# Patient Record
Sex: FEMALE | Race: WHITE | Employment: UNEMPLOYED | URBAN - METROPOLITAN AREA
[De-identification: names, ages, dates, MRNs, and addresses within clinical notes are randomized per-mention and may not be internally consistent; named-entity substitution may affect disease eponyms.]

---

## 2020-01-14 ENCOUNTER — OFFICE VISIT (OUTPATIENT)
Dept: SURGERY | Age: 30
End: 2020-01-14

## 2020-01-14 VITALS
HEIGHT: 61 IN | WEIGHT: 105 LBS | DIASTOLIC BLOOD PRESSURE: 81 MMHG | BODY MASS INDEX: 19.83 KG/M2 | SYSTOLIC BLOOD PRESSURE: 120 MMHG | HEART RATE: 74 BPM

## 2020-01-14 DIAGNOSIS — N60.11 FIBROCYSTIC BREAST CHANGES OF BOTH BREASTS: Primary | ICD-10-CM

## 2020-01-14 DIAGNOSIS — N60.12 FIBROCYSTIC BREAST CHANGES OF BOTH BREASTS: Primary | ICD-10-CM

## 2020-01-14 DIAGNOSIS — N63.10 BREAST MASS, RIGHT: ICD-10-CM

## 2020-01-14 NOTE — LETTER
January 14, 2020 [address] [address] Dear Kenna Chung: Thank you for requesting access to [facility]. Please follow the instructions below to view your test results, access and download parts of your medical record, view details of your past and upcoming appointments, and view your medications online. How Do I Sign Up? 1. In your internet browser, go to [email or web address]. aspx 2. Click on the First Time User? Click Here link in the Sign In box. You will see the New Member Sign Up page. 3. Enter your [facility] Access Code exactly as it appears below. You will not need to use this code after youve completed the sign-up process. If you do not sign up before the expiration date, you must request a new code. · [facility] Access Code: [email or web address] 
· Expires: 2/28/2020  9:58 AM 
 
4. Enter the last four digits of your Social Security Number (xxxx) and Date of Birth (mm/dd/yyyy) as indicated and click Submit. You will be taken to the next sign-up page. 5. Create a [facility] ID. This will be your [facility] login ID and cannot be changed, so think of one that is secure and easy to remember. 6. Create a [facility] password. You can change your password at any time. 7. Enter your Password Reset Question and Answer. This can be used at a later time if you forget your password. 8. Enter your e-mail address. You will receive e-mail notification when new information is available in [address]. 9. Click Sign Up. You can now view and download portions of your medical record. 10. Click the Download Summary menu link to download a portable copy of your medical information. Additional Information: If you require any assistance or have any questions, please contact our [address] at [phone], email at [email or web address]. Remember, [facility] is NOT to be used for urgent needs.  For medical emergencies, dial 911. Now available from your iPhone and Android! Sincerely, Graham Koyanagi

## 2020-01-14 NOTE — LETTER
1/14/2020 1:50 PM 
 
Patient:  Yaa Martin YOB: 1990 Date of Visit: 1/14/2020 Dear Kimber Israel MD 
[address] [address] [place] [id number] VIA Facsimile: [phone] 
 : Thank you for referring Ms. Yaa Martin to me for evaluation/treatment. Below are the relevant portions of my assessment and plan of care. ASSESSMENT and PLAN Encounter Diagnoses Name Primary?  Fibrocystic breast changes of both breasts Yes  Breast mass, right Right nipple mass - discussed findings with patient of likely diagnosis of either sebaceous cyst or clogged milk duct with some congealed milk trapped and creating mass. Discussed observation with use of warm compresses vs aspiration vs I&D. Would not recommend I&D as it could lead to issues with the baby latching and with pumping. As the baby is not able to latch on that side currently and there is concern about weight gain, she requested attempted aspiration. 0.5ml of sebum and/or congealed milk aspirated without issue and mass decompressed. Reviewed possibility of recurrence and risk and s/sx of infection. She will monitor the area and follow-up PRN. Continue to nurse and pump as desired. She is comfortable with this plan. All questions answered and she stated understanding. If you have questions, please do not hesitate to call me. I look forward to following Ms. Adal Mosley along with you. Sincerely, Evgeny Cueto NP

## 2020-01-14 NOTE — PROGRESS NOTES
HISTORY OF PRESENT ILLNESS  Elena Fall is a 34 y.o. female. HPI   NEW patient presents for consultation at the request of Dr. Gurpreet Montalvo for mass on RIGHT nipple that she has noticed for 2-3 weeks. Says that this has not changed since she first noticed it and is not painful. She is breastfeeding (pretty much pumping at this point), and says that her milk supply is less from that breast.  Her infant son has trouble latching on this side. He has had some medical issues and had to be hospitalized after his birth for RSV. FH - maternal great aunt was diagnosed with breast cancer at an unknown age. She has not had any breast imaging. OB History    No obstetric history on file. Obstetric Comments   Menarche 15, LMP 2/2019, # of children 11, age of 4st delivery 21, Hysterectomy/oophorectomy No/No, Breast bx No, history of breast feeding Yes, currently, BCP No, Hormone therapy No             Review of Systems   Constitutional: Negative. HENT: Negative. Eyes: Negative. Respiratory: Negative. Cardiovascular: Negative. Gastrointestinal: Negative. Genitourinary: Negative. Musculoskeletal: Negative. Skin: Negative. Neurological: Positive for headaches. Endo/Heme/Allergies: Negative. Psychiatric/Behavioral: Negative. Physical Exam  Constitutional:       Appearance: Normal appearance. Chest:       Neurological:      Mental Status: She is alert. Psychiatric:         Attention and Perception: Attention normal.         Mood and Affect: Mood normal.         Speech: Speech normal.         Behavior: Behavior normal.     ASPIRATION OF BREAST CYST  Indication : CYST:  right  nipple  Ultrasound Findings: none  Prep : Alcohol. Anesthesia : Lidocaine with epinephrine, 1ml  Guidance : palpation   Yield :  0.5ml milky thick fluid was aspirated with an 18 gauge needle. Effect : Cyst completely aspirated.   Tolerance: Pt tolerated the procedure with minimal discomfort  Diposition:  Follow up if new mass occurs    Visit Vitals  /81   Pulse 74   Ht 5' 1\" (1.549 m)   Wt 105 lb (47.6 kg)   LMP  (Approximate) Comment: One year ago - pre-pregnancy   BMI 19.84 kg/m²     ASSESSMENT and PLAN  Encounter Diagnoses   Name Primary?  Fibrocystic breast changes of both breasts Yes    Breast mass, right      Right nipple mass - discussed findings with patient of likely diagnosis of either sebaceous cyst or clogged milk duct with some congealed milk trapped and creating mass. Discussed observation with use of warm compresses vs aspiration vs I&D. Would not recommend I&D as it could lead to issues with the baby latching and with pumping. As the baby is not able to latch on that side currently and there is concern about weight gain, she requested attempted aspiration. 0.5ml of sebum and/or congealed milk aspirated without issue and mass decompressed. Reviewed possibility of recurrence and risk and s/sx of infection. She will monitor the area and follow-up PRN. Continue to nurse and pump as desired. She is comfortable with this plan. All questions answered and she stated understanding.

## 2021-01-25 ENCOUNTER — OFFICE VISIT (OUTPATIENT)
Dept: SURGERY | Age: 31
End: 2021-01-25
Payer: COMMERCIAL

## 2021-01-25 VITALS
DIASTOLIC BLOOD PRESSURE: 64 MMHG | HEIGHT: 61 IN | SYSTOLIC BLOOD PRESSURE: 107 MMHG | HEART RATE: 77 BPM | WEIGHT: 105 LBS | BODY MASS INDEX: 19.83 KG/M2 | TEMPERATURE: 97.6 F

## 2021-01-25 DIAGNOSIS — N63.20 LEFT BREAST MASS: Primary | ICD-10-CM

## 2021-01-25 PROCEDURE — 99214 OFFICE O/P EST MOD 30 MIN: CPT | Performed by: SURGERY

## 2021-01-25 NOTE — PROGRESS NOTES
HISTORY OF PRESENT ILLNESS  Nettie Sousa is a 27 y.o. female. HPI ESTABLISHED patient here for LEFT breast mass x 2 weeks. No other symptoms to report. Saw Serena Marrero 1/14/2020 for RIGHT breast lump. She was nursing at the time. 0.5 ml sebum and/or congealed milk aspirated. This issue has resolved. Patient discontinued nursing in March 2020.     FH - maternal great aunt was diagnosed with breast cancer at an unknown age.       She has not had any breast imaging. Review of Systems   All other systems reviewed and are negative. Physical Exam  Vitals signs and nursing note reviewed. Chest:      Breasts: Breasts are symmetrical.         Right: No inverted nipple, mass, nipple discharge, skin change or tenderness. Left: Mass (small round 1 cm mass 1:00 ) present. No inverted nipple, nipple discharge, skin change or tenderness. Lymphadenopathy:      Cervical: No cervical adenopathy. Upper Body:      Right upper body: No supraclavicular, axillary or pectoral adenopathy. Left upper body: No supraclavicular, axillary or pectoral adenopathy. ASSESSMENT and PLAN    ICD-10-CM ICD-9-CM    1. Left breast mass  N63.20 611.72 VAL 3D DAISY W MAMMO BI DX INCL CAD     Left breast mass 1:00. Will schedule diagnostic mammo and us. Plan will depend on imaging. 30 minutes was spent with patient on counseling and coordination of care.

## 2021-01-25 NOTE — LETTER
1/27/2021 Patient: Marce Sousa YOB: 1990 Date of Visit: 1/25/2021 Kymberly Lopez MD 
[address] [place] [id number] Via Fax: [phone] Dear Kymberly Lopez MD, Thank you for referring Ms. Marce Sousa to [address] [place] [address] for evaluation. My notes for this consultation are attached. If you have questions, please do not hesitate to call me. I look forward to following your patient along with you. Sincerely, Sharyn Fowler MD

## 2021-01-25 NOTE — PATIENT INSTRUCTIONS

## 2021-02-03 ENCOUNTER — HOSPITAL ENCOUNTER (OUTPATIENT)
Dept: MAMMOGRAPHY | Age: 31
Discharge: HOME | End: 2021-02-03
Attending: SURGERY
Payer: COMMERCIAL

## 2021-02-03 DIAGNOSIS — N63.20 LEFT BREAST MASS: ICD-10-CM

## 2021-02-03 PROCEDURE — 76642 ULTRASOUND BREAST LIMITED: CPT

## 2021-02-03 PROCEDURE — 77062 BREAST TOMOSYNTHESIS BI: CPT

## 2024-08-29 ENCOUNTER — OFFICE VISIT (OUTPATIENT)
Dept: OBSTETRICS AND GYNECOLOGY | Facility: CLINIC | Age: 34
End: 2024-08-29
Payer: MEDICAID

## 2024-08-29 VITALS
DIASTOLIC BLOOD PRESSURE: 82 MMHG | HEIGHT: 61 IN | BODY MASS INDEX: 20.77 KG/M2 | WEIGHT: 110 LBS | SYSTOLIC BLOOD PRESSURE: 118 MMHG

## 2024-08-29 DIAGNOSIS — O21.9 NAUSEA/VOMITING IN PREGNANCY: ICD-10-CM

## 2024-08-29 DIAGNOSIS — Z64.1 GRAND MULTIPARA: ICD-10-CM

## 2024-08-29 DIAGNOSIS — N92.6 MISSED MENSES: Primary | ICD-10-CM

## 2024-08-29 LAB
B-HCG UR QL: POSITIVE
BILIRUB BLD-MCNC: NEGATIVE MG/DL
CLARITY, POC: CLEAR
COLOR UR: YELLOW
EXPIRATION DATE: ABNORMAL
GLUCOSE UR STRIP-MCNC: NEGATIVE MG/DL
INTERNAL NEGATIVE CONTROL: ABNORMAL
INTERNAL POSITIVE CONTROL: ABNORMAL
KETONES UR QL: NEGATIVE
LEUKOCYTE EST, POC: NEGATIVE
Lab: ABNORMAL
NITRITE UR-MCNC: NEGATIVE MG/ML
PH UR: 5 [PH] (ref 5–8)
PROT UR STRIP-MCNC: ABNORMAL MG/DL
RBC # UR STRIP: NEGATIVE /UL
SP GR UR: 1.01 (ref 1–1.03)
UROBILINOGEN UR QL: NORMAL

## 2024-08-29 RX ORDER — ONDANSETRON 4 MG/1
4 TABLET, ORALLY DISINTEGRATING ORAL EVERY 8 HOURS PRN
Qty: 30 TABLET | Refills: 2 | Status: SHIPPED | OUTPATIENT
Start: 2024-08-29

## 2024-08-29 NOTE — PROGRESS NOTES
Confirmation of pregnancy     Chief Complaint   Patient presents with    Amenorrhea     Confirmation of pregnancy         Krupa Jacob is being seen today for evaluation of absence of menses. Due to her period being late, she tested for pregnancy and had + home UPT. She is a 33 y.o. . This problem is new to me, the examiner.       OB History          8    Para   6    Term   6            AB   1    Living   6         SAB        IAB        Ectopic   1    Molar        Multiple        Live Births   6          Obstetric Comments    x 6- proven pelvis 8#4oz  Ectopic x 1               HPI     LNMP: 2024  Confident with date: Yes  Taking prenatal vitamins: Yes. Needs RX: No  Planned pregnancy: No  Prior obstetric issues, potential pregnancy concerns:  x 6- in multiple states (delivery from 2019 in records- others requested)  Family history of genetic issues (includes FOB): no  Varicella Hx: virus  Flu vaccine: no  COVID 19 vaccine: yes. Booster vaccine: no  History of STDs: no  Current medications: PNV  Last pap smear:   Smoker: No  Drug or alcohol abuse: No  H/O physical, emotional or sexual abuse: no  H/O mental health disorder: no  Prior testing for Cystic Fibrosis Carrier or Sickle Cell Trait- no  Prepregnancy BMI - Body mass index is 20.78 kg/m².    Past Medical History:   Diagnosis Date    Ectopic pregnancy     Migraine     PMS (premenstrual syndrome)     Urinary tract infection        Past Surgical History:   Procedure Laterality Date    BREAST AUGMENTATION      WISDOM TOOTH EXTRACTION           Current Outpatient Medications:     Prenatal Vit-Fe Fumarate-FA (PRENATAL PO), Take  by mouth., Disp: , Rfl:     ondansetron ODT (ZOFRAN-ODT) 4 MG disintegrating tablet, Place 1 tablet on the tongue Every 8 (Eight) Hours As Needed for Nausea., Disp: 30 tablet, Rfl: 2    Allergies   Allergen Reactions    Sulfamethoxazole-Trimethoprim Hives       Social History     Socioeconomic  "History    Marital status:    Tobacco Use    Smoking status: Never    Smokeless tobacco: Never   Vaping Use    Vaping status: Never Used   Substance and Sexual Activity    Alcohol use: Never    Drug use: Never    Sexual activity: Yes     Partners: Male     Birth control/protection: Condom       History reviewed. No pertinent family history.    Review of systems     Review of Systems   Gastrointestinal:  Positive for nausea. Negative for vomiting.   Genitourinary:  Positive for menstrual problem. Negative for vaginal bleeding.       Objective    /82   Ht 154.9 cm (61\")   Wt 49.9 kg (110 lb)   LMP 07/10/2024 (Approximate)   BMI 20.78 kg/m²     Physical Exam  Vitals reviewed.   Constitutional:       General: She is awake. She is not in acute distress.     Appearance: She is not ill-appearing.   Eyes:      Conjunctiva/sclera: Conjunctivae normal.   Pulmonary:      Effort: Pulmonary effort is normal. No respiratory distress.   Musculoskeletal:      Cervical back: Neck supple. No rigidity.   Skin:     General: Skin is warm and dry.      Capillary Refill: Capillary refill takes less than 2 seconds.   Neurological:      Mental Status: She is alert and oriented to person, place, and time.   Psychiatric:         Mood and Affect: Mood and affect normal.         Behavior: Behavior normal.           Assessment/Plan      Missed menses: + UPT in office. LNMP 7/13/2024 =  6w5d EGA with an EDC=4/19/2025. US confirms IUP with +FCA: Yes. IMP: GS w YS and viable fetal pole seen with UT. 6w1d. FHR 124bpm. UT anteverted. OV WNL.  Oriented to practice.     Pregnancy: Disc importance of regular prenatal care. Enc PNV daily. Counseled on providers and on call phone. Disc Tylenol products are ok and encouraged no ibuprofen or ASA in pregnancy.  Disc exercise in pregnancy, diet, expected weight gain, etc. Enc no use of tobacco, vaping, drugs, or alcohol during pregnancy. Rev warn s/s of SAB.     Labs: Pt counseled on genetic " screening, Quad screen, AFP, and NIPS.     BMI is within normal parameters. No other follow-up for BMI required.      Smoker- non-smoker    6.   COVID19 precautions were reviewed with the patient. Continue to encourage good hand hygiene.  Hand hygeine performed before and after seeing the patient. Also encouraged COVID booster vaccine at 6 month interval from last COVID vaccine. She is s/p Covid vaccine; no booster    7.  Flu vaccine. Encouraged the flu vaccine during pregnancy. Discuss normal physiological changes during pregnancy increase the susceptibility of the flu virus and increase the risk of severe illness for the pregnant woman. Disc flu can be harmful to the unborn baby as well. Enc the flu vaccine. Disc with patient that getting the flu vaccine is the first and most important step in protecting against the flu. Flu vaccines given during pregnancy help protect both the mother and the baby. Getting the flu vaccine during pregnancy also helps protect the  from flu illness for several months after their birth, when then are too young to get vaccinated. Also disc the importance of good hand hygiene and avoiding people who are sick. The patient has not received the flu vaccine.     8. Nausea- taking Unisom/B6 with slight improvement; ERX Zofran    9. The patient understands that she is a grand multipara because she has given birth to 5 or more infants which increases her risk during pregnancy for factors including but not limited to abnormal fetal presentation, precipitate delivery, uterine atony, placenta previa, uterine rupture, postpartum hemorrhage, etc.     10. Hx of SGA per records in EPIC- 5#14oz       All questions answered.     Counseling was given to patient for the following topics: diagnostic results, instructions for management, risk factor reductions, prognosis, patient and family education, impressions, risks and benefits of treatment options, and importance of treatment compliance .  Total time of the encounter was 30 minutes and 25 minutes was spent counseling.  This time does not include time spent performing ultrasound.    Encounter Diagnoses   Name Primary?    Missed menses Yes    Nausea/vomiting in pregnancy     Grand multipara        Diagnoses and all orders for this visit:    Missed menses  -     POC Urinalysis Dipstick  -     POC Pregnancy, Urine    Nausea/vomiting in pregnancy  -     ondansetron ODT (ZOFRAN-ODT) 4 MG disintegrating tablet; Place 1 tablet on the tongue Every 8 (Eight) Hours As Needed for Nausea.    Grand multipara    Other orders  -     Prenatal Vit-Fe Fumarate-FA (PRENATAL PO); Take  by mouth.        RTO in 2 weeks for new OB exam/Pap, labs and repeat ultrasound (FHR 124bpm)    Deedee Patiño, APRN  8/29/2024  11:23 EDT

## 2024-09-11 ENCOUNTER — INITIAL PRENATAL (OUTPATIENT)
Dept: OBSTETRICS AND GYNECOLOGY | Facility: CLINIC | Age: 34
End: 2024-09-11
Payer: MEDICAID

## 2024-09-11 VITALS — WEIGHT: 110 LBS | SYSTOLIC BLOOD PRESSURE: 118 MMHG | BODY MASS INDEX: 20.78 KG/M2 | DIASTOLIC BLOOD PRESSURE: 76 MMHG

## 2024-09-11 DIAGNOSIS — Z64.1 GRAND MULTIPARA: ICD-10-CM

## 2024-09-11 DIAGNOSIS — Z36.9 ENCOUNTER FOR ANTENATAL SCREENING, UNSPECIFIED: ICD-10-CM

## 2024-09-11 DIAGNOSIS — Z78.9 BREASTFEEDING (INFANT): ICD-10-CM

## 2024-09-11 DIAGNOSIS — Z34.91 INITIAL OBSTETRIC VISIT IN FIRST TRIMESTER: Primary | ICD-10-CM

## 2024-09-11 DIAGNOSIS — Z87.59 HISTORY OF PRIOR PREGNANCY WITH IUGR NEWBORN: ICD-10-CM

## 2024-09-11 DIAGNOSIS — Z01.419 CERVICAL SMEAR, AS PART OF ROUTINE GYNECOLOGICAL EXAMINATION: ICD-10-CM

## 2024-09-11 DIAGNOSIS — O20.8 SUBCHORIONIC HEMORRHAGE IN FIRST TRIMESTER: ICD-10-CM

## 2024-09-11 PROBLEM — O21.9 NAUSEA AND VOMITING DURING PREGNANCY PRIOR TO 22 WEEKS GESTATION: Status: ACTIVE | Noted: 2024-09-11

## 2024-09-11 LAB
BILIRUB BLD-MCNC: NEGATIVE MG/DL
CLARITY, POC: CLEAR
COLOR UR: YELLOW
GLUCOSE UR STRIP-MCNC: NEGATIVE MG/DL
KETONES UR QL: NEGATIVE
LEUKOCYTE EST, POC: ABNORMAL
NITRITE UR-MCNC: NEGATIVE MG/ML
PH UR: 5 [PH] (ref 5–8)
PROT UR STRIP-MCNC: ABNORMAL MG/DL
RBC # UR STRIP: NEGATIVE /UL
SP GR UR: 1.01 (ref 1–1.03)
UROBILINOGEN UR QL: NORMAL

## 2024-09-11 PROCEDURE — 99214 OFFICE O/P EST MOD 30 MIN: CPT | Performed by: NURSE PRACTITIONER

## 2024-09-11 RX ORDER — FAMOTIDINE 20 MG/1
20 TABLET, FILM COATED ORAL 2 TIMES DAILY
Qty: 60 TABLET | Refills: 1 | Status: SHIPPED | OUTPATIENT
Start: 2024-09-11 | End: 2025-09-11

## 2024-09-11 RX ORDER — PROMETHAZINE HYDROCHLORIDE 12.5 MG/1
12.5 TABLET ORAL EVERY 6 HOURS PRN
Qty: 30 TABLET | Refills: 1 | Status: SHIPPED | OUTPATIENT
Start: 2024-09-11

## 2024-09-12 LAB
ABO GROUP BLD: NORMAL
BASOPHILS # BLD AUTO: 0.1 X10E3/UL (ref 0–0.2)
BASOPHILS NFR BLD AUTO: 1 %
BLD GP AB SCN SERPL QL: NEGATIVE
EOSINOPHIL # BLD AUTO: 0.1 X10E3/UL (ref 0–0.4)
EOSINOPHIL NFR BLD AUTO: 1 %
ERYTHROCYTE [DISTWIDTH] IN BLOOD BY AUTOMATED COUNT: 13.4 % (ref 11.7–15.4)
HBA1C MFR BLD: 5.1 % (ref 4.8–5.6)
HBV SURFACE AG SERPL QL IA: NEGATIVE
HCT VFR BLD AUTO: 42.9 % (ref 34–46.6)
HCV IGG SERPL QL IA: NON REACTIVE
HGB A MFR BLD ELPH: 97.3 % (ref 96.4–98.8)
HGB A2 MFR BLD ELPH: 2.7 % (ref 1.8–3.2)
HGB BLD-MCNC: 14.1 G/DL (ref 11.1–15.9)
HGB F MFR BLD ELPH: 0 % (ref 0–2)
HGB FRACT BLD-IMP: NORMAL
HGB S MFR BLD ELPH: 0 %
HIV 1+2 AB+HIV1 P24 AG SERPL QL IA: NON REACTIVE
IMM GRANULOCYTES # BLD AUTO: 0 X10E3/UL (ref 0–0.1)
IMM GRANULOCYTES NFR BLD AUTO: 0 %
LYMPHOCYTES # BLD AUTO: 1.7 X10E3/UL (ref 0.7–3.1)
LYMPHOCYTES NFR BLD AUTO: 19 %
MCH RBC QN AUTO: 29 PG (ref 26.6–33)
MCHC RBC AUTO-ENTMCNC: 32.9 G/DL (ref 31.5–35.7)
MCV RBC AUTO: 88 FL (ref 79–97)
MONOCYTES # BLD AUTO: 1 X10E3/UL (ref 0.1–0.9)
MONOCYTES NFR BLD AUTO: 11 %
NEUTROPHILS # BLD AUTO: 5.8 X10E3/UL (ref 1.4–7)
NEUTROPHILS NFR BLD AUTO: 68 %
PLATELET # BLD AUTO: 266 X10E3/UL (ref 150–450)
RBC # BLD AUTO: 4.87 X10E6/UL (ref 3.77–5.28)
RH BLD: POSITIVE
RPR SER QL: NON REACTIVE
RUBV IGG SERPL IA-ACNC: 2.83 INDEX
VZV IGG SER IA-ACNC: 1667 INDEX
WBC # BLD AUTO: 8.6 X10E3/UL (ref 3.4–10.8)

## 2024-09-13 LAB
AMPHETAMINES UR QL SCN: NEGATIVE NG/ML
BACTERIA UR CULT: NORMAL
BACTERIA UR CULT: NORMAL
BARBITURATES UR QL SCN: NEGATIVE NG/ML
BENZODIAZ UR QL SCN: NEGATIVE NG/ML
BUPRENORPHINE UR QL: NEGATIVE NG/ML
BZE UR QL SCN: NEGATIVE NG/ML
C TRACH RRNA SPEC QL NAA+PROBE: NEGATIVE
CANNABINOIDS UR QL SCN: NEGATIVE NG/ML
CREAT UR-MCNC: 236.7 MG/DL (ref 20–300)
FENTANYL UR-MCNC: NEGATIVE PG/ML
LABORATORY COMMENT REPORT: NORMAL
MEPERIDINE UR QL: NEGATIVE NG/ML
METHADONE UR QL SCN: NEGATIVE NG/ML
N GONORRHOEA RRNA SPEC QL NAA+PROBE: NEGATIVE
OPIATES UR QL SCN: NEGATIVE NG/ML
OXYCODONE+OXYMORPHONE UR QL SCN: NEGATIVE NG/ML
PCP UR QL: NEGATIVE NG/ML
PH UR: 5.5 [PH] (ref 4.5–8.9)
PROPOXYPH UR QL SCN: NEGATIVE NG/ML
T VAGINALIS RRNA SPEC QL NAA+PROBE: NEGATIVE
TRAMADOL UR QL SCN: NEGATIVE NG/ML

## 2024-09-15 LAB
CYTOLOGIST CVX/VAG CYTO: NORMAL
CYTOLOGY CVX/VAG DOC CYTO: NORMAL
CYTOLOGY CVX/VAG DOC THIN PREP: NORMAL
DX ICD CODE: NORMAL
HPV I/H RISK 4 DNA CVX QL PROBE+SIG AMP: NEGATIVE
Lab: NORMAL
OTHER STN SPEC: NORMAL
STAT OF ADQ CVX/VAG CYTO-IMP: NORMAL

## 2024-09-23 PROBLEM — Z34.91 INITIAL OBSTETRIC VISIT IN FIRST TRIMESTER: Status: ACTIVE | Noted: 2024-09-23

## 2024-09-23 PROBLEM — O20.8 SUBCHORIONIC HEMORRHAGE IN FIRST TRIMESTER: Status: ACTIVE | Noted: 2024-09-23

## 2024-09-23 PROBLEM — Z87.59 HISTORY OF PRIOR PREGNANCY WITH IUGR NEWBORN: Status: ACTIVE | Noted: 2024-09-23

## 2024-10-09 ENCOUNTER — ANESTHESIA EVENT (OUTPATIENT)
Dept: PERIOP | Facility: HOSPITAL | Age: 34
End: 2024-10-09
Payer: MEDICAID

## 2024-10-09 ENCOUNTER — ROUTINE PRENATAL (OUTPATIENT)
Dept: OBSTETRICS AND GYNECOLOGY | Facility: CLINIC | Age: 34
End: 2024-10-09
Payer: MEDICAID

## 2024-10-09 DIAGNOSIS — Z36.9 ENCOUNTER FOR ANTENATAL SCREENING, UNSPECIFIED: ICD-10-CM

## 2024-10-09 DIAGNOSIS — Z64.1 GRAND MULTIPARA: ICD-10-CM

## 2024-10-09 DIAGNOSIS — O02.1 MISSED ABORTION: ICD-10-CM

## 2024-10-09 DIAGNOSIS — O20.8 SUBCHORIONIC HEMORRHAGE IN FIRST TRIMESTER: ICD-10-CM

## 2024-10-09 DIAGNOSIS — Z67.90 BLOOD TYPE, RH POSITIVE: ICD-10-CM

## 2024-10-09 DIAGNOSIS — O36.80X0 PREGNANCY WITH INCONCLUSIVE FETAL VIABILITY, SINGLE OR UNSPECIFIED FETUS: Primary | ICD-10-CM

## 2024-10-09 DIAGNOSIS — Z87.59 HISTORY OF PRIOR PREGNANCY WITH IUGR NEWBORN: ICD-10-CM

## 2024-10-09 DIAGNOSIS — O21.9 NAUSEA AND VOMITING DURING PREGNANCY PRIOR TO 22 WEEKS GESTATION: ICD-10-CM

## 2024-10-09 PROBLEM — Z34.91 INITIAL OBSTETRIC VISIT IN FIRST TRIMESTER: Status: RESOLVED | Noted: 2024-09-23 | Resolved: 2024-10-09

## 2024-10-09 LAB — HCG INTACT+B SERPL-ACNC: NORMAL MIU/ML

## 2024-10-09 PROCEDURE — S0260 H&P FOR SURGERY: HCPCS | Performed by: OBSTETRICS & GYNECOLOGY

## 2024-10-09 RX ORDER — SODIUM CHLORIDE 0.9 % (FLUSH) 0.9 %
10 SYRINGE (ML) INJECTION AS NEEDED
Status: CANCELLED | OUTPATIENT
Start: 2024-10-09

## 2024-10-09 RX ORDER — SODIUM CHLORIDE 0.9 % (FLUSH) 0.9 %
10 SYRINGE (ML) INJECTION EVERY 12 HOURS SCHEDULED
Status: CANCELLED | OUTPATIENT
Start: 2024-10-09

## 2024-10-09 RX ORDER — ACETAMINOPHEN 500 MG
1000 TABLET ORAL ONCE
Status: CANCELLED | OUTPATIENT
Start: 2024-10-09 | End: 2024-10-09

## 2024-10-09 NOTE — PROGRESS NOTES
"No chief complaint on file.      HPI: 34 y.o.  at 13w0d ***    Relevant data reviewed:    There were no vitals filed for this visit.  Total weight gain for pregnancy:  0 kg (0 lb)    Cx exam:   / /        Review of systems:   Gen: {HEB ROS OB const (Optional):65444::\"negative\"}  CV:     {HEB ROS OB CV (Optional):37419::\"negative\"}  GI: {HEB ROS OB GI (Optional):51522::\"negative\"}  :   {HEB ROS :85182::\"negative\"}  MS:    {heb ROS OB MS (Optional):04955::\"negative\"}  Neuro: {HEB OB ROS neuro (Optional):99739::\"negative\"}  Pul: {HEB ROS OB pul (Optional):66218::\"negative\"}    Physical Exam        {OBGYN Imaging (Optional):90044}    A/P  1. Intrauterine pregnancy at 13w0d   2. Pregnancy Risk:  {OBPREGRISK:70077}    Diagnoses and all orders for this visit:    1. Grand multipara (Primary)    2. Nausea and vomiting during pregnancy prior to 22 weeks gestation    3. History of prior pregnancy with IUGR     4. Subchorionic hemorrhage in first trimester  Overview:  2.0 x 1.2cm ,           {HEB OB general discussions (Optional):24259}  -----------------------  PLAN:   No follow-ups on file.    @@***This time is not included in the time used to interpret the ultrasound also reported today.***    Jun Fisher MD  10/9/2024 06:41 EDT  "

## 2024-10-09 NOTE — H&P
PREOPERATIVE HISTORY AND PHYSICAL      Patient Care Team:  Provider, No Known as PCP - Deedee White APRN as Nurse Practitioner (Obstetrics and Gynecology)    Chief complaint: Missed     Pt is a 34 y.o.   Patient's last menstrual period was 07/10/2024 (approximate).     HPI:History of Present Illness  Pt desires suction D&C for missed ab @ 13 weeks.  U/s today showed IUP w/ fetal pole, no cardiac at 11 weeks.  Pt denies fever or bleeding or passage of tissue.  This was discovered at a routine f/u u/s for prior MANJINDER.  Pt is rh pos.       PMHx:   Past Medical History:   Diagnosis Date    Ectopic pregnancy     Migraine     PMS (premenstrual syndrome)     Urinary tract infection        Current problem list:    Missed  w/ fetal pole, 13 weeks       PSHx:   Past Surgical History:   Procedure Laterality Date    BREAST AUGMENTATION      WISDOM TOOTH EXTRACTION         Social Hx:   Social History     Socioeconomic History    Marital status:    Tobacco Use    Smoking status: Never    Smokeless tobacco: Never   Vaping Use    Vaping status: Never Used   Substance and Sexual Activity    Alcohol use: Never    Drug use: Never    Sexual activity: Yes     Partners: Male     Birth control/protection: Condom       FHx: No family history on file.    Debilities/Disabilities Identified: None    Emotional Behavior: Appropriate    PGyn Hx:  otherwise noncontributory    POBHx:   OB History    Para Term  AB Living   8 6 6 0 1 6   SAB IAB Ectopic Molar Multiple Live Births   0 0 1 0 0 6      # Outcome Date GA Lbr Alan/2nd Weight Sex Type Anes PTL Lv   8 Current            7 Term 23   3175 g (7 lb) M Vag-Spont   SUNNY   6 Ectopic            5 Term 19   3544 g (7 lb 13 oz) M Vag-Spont   SUNNY   4 Term 18   3402 g (7 lb 8 oz) F Vag-Spont   SUNNY   3 Term 17   3742 g (8 lb 4 oz) M Vag-Spont   SUNNY   2 Term 06/02/15   2750 g (6 lb 1 oz) F Vag-Spont   SUNNY   1 Term  13 41w0d  2665 g (5 lb 14 oz) F Vag-Spont   SUNNY      Obstetric Comments    x 6- proven pelvis 8#4oz   Ectopic x 1       Allergies: Sulfamethoxazole-trimethoprim    Medications:   No medications prior to admission.                            No current facility-administered medications for this encounter.    Current Outpatient Medications:     famotidine (Pepcid) 20 MG tablet, Take 1 tablet by mouth 2 (Two) Times a Day., Disp: 60 tablet, Rfl: 1    ondansetron ODT (ZOFRAN-ODT) 4 MG disintegrating tablet, Place 1 tablet on the tongue Every 8 (Eight) Hours As Needed for Nausea. (Patient not taking: Reported on 2024), Disp: 30 tablet, Rfl: 2    Prenatal Vit-Fe Fumarate-FA (PRENATAL PO), Take  by mouth., Disp: , Rfl:     promethazine (PHENERGAN) 12.5 MG tablet, Take 1 tablet by mouth Every 6 (Six) Hours As Needed for Nausea or Vomiting., Disp: 30 tablet, Rfl: 1        Review of Systems   Constitutional: Negative.    HENT: Negative.     Eyes: Negative.    Respiratory: Negative.     Cardiovascular: Negative.    Gastrointestinal: Negative.    Endocrine: Negative.    Musculoskeletal: Negative.    Skin: Negative.    Allergic/Immunologic: Negative.    Neurological: Negative.    Hematological: Negative.    Psychiatric/Behavioral: Negative.         Vital Signs  LMP 07/10/2024 (Approximate)     Physical Exam  Vitals and nursing note reviewed.   Constitutional:       Appearance: She is well-developed.   HENT:      Head: Normocephalic and atraumatic.   Cardiovascular:      Rate and Rhythm: Normal rate.   Pulmonary:      Effort: Pulmonary effort is normal.   Abdominal:      General: There is no distension.      Palpations: Abdomen is soft. There is no mass.      Tenderness: There is no abdominal tenderness. There is no guarding.   Genitourinary:     Vagina: No vaginal discharge.   Musculoskeletal:         General: No tenderness or deformity. Normal range of motion.      Cervical back: Normal range of motion.   Skin:      General: Skin is warm and dry.      Coloration: Skin is not pale.      Findings: No erythema or rash.   Neurological:      Mental Status: She is alert and oriented to person, place, and time.   Psychiatric:         Behavior: Behavior normal.         Thought Content: Thought content normal.         Judgment: Judgment normal.             IMPRESSION:    Missed  @ 13 wks w/ fetal pole.  Pt aware of options available to here.                                    PLAN:    Procedure(s):  DILATATION AND CURETTAGE WITH SUCTION    RISKS, ALTERNATIVES, COMPLICATIONS OF THE PROCEDURE INCLUDING BUT NOT LIMITED TO:    INTRAOPERATIVE RISKS: INJURY TO INTERNAL AND ADJACENT ORGANS AND STRUCTURES (BOWEL, BLADDER, URETER,BLOOD VESSELS) OR HEMORRHAGE REQUIRING FURTHER SURGERY (LAPAROTOMY),  POSSIBLE NON-DIAGNOSTIC FINDINGS, DISCOVERY OF POSSIBLE MALIGNANCY, INFECTION, AND DEATH;   POSTOP COMPLICATIONS: BLEEDING, INFECTION (REQUIRING POSSIBLE REOPERATION), FAILURE OF GOAL OF SURGERY AND RECURRENCE OF ORIGINAL SYMPTOMS, PNEUMONIA, PULMONARY EMBOLISM, AND DEATH;  WERE EXPLAINED TO THE PT WHO VERBALIZED HER UNDERSTANDING.             I discussed the patients findings and my recommendations with patient.     Jun Fisher MD  10/09/24  12:44 EDT

## 2024-10-09 NOTE — PROGRESS NOTES
EVALUATION AND MANAGEMENT ENCOUNTER    S:  Chief Complaint   Patient presents with    Routine Prenatal Visit       HPI:  Krupa Jacob is a 34 y.o.  with Patient's last menstrual period was 07/10/2024 (approximate). here for  u/s.    Review of Systems: neg    Vital Signs: LMP 07/10/2024 (Approximate)      Brief Urine Lab Results  (Last result in the past 365 days)        Color   Clarity   Blood   Leuk Est   Nitrite   Protein   CREAT   Urine HCG        24 0956             236.7               U/s Fetal pole, no cardiac, collapsed sac. Nl ovs     Physical Exam: def           IMPRESSION:      Diagnoses and all orders for this visit:    1. Pregnancy with inconclusive fetal viability, single or unspecified fetus (Primary)  -     HCG, B-subunit, Quantitative    2. Grand multipara    3. Nausea and vomiting during pregnancy prior to 22 weeks gestation    4. History of prior pregnancy with IUGR     5. Subchorionic hemorrhage in first trimester  Overview:  2.0 x 1.2cm ,       6. Encounter for  screening, unspecified  -     POC Urinalysis Dipstick    7. Missed  w/ fetal pole, 13 weeks  -     Case Request; Standing  -     CBC and Differential; Future  -     sodium chloride 0.9 % flush 10 mL  -     sodium chloride 0.9 % flush 10 mL  -     acetaminophen (TYLENOL) tablet 1,000 mg    8. Blood type, Rh positive    Other orders  -     Code Status and Medical Interventions: CPR (Attempt to Resuscitate); Full Support; Standing  -     Follow Anesthesia Guidelines / Protocol; Future  -     Follow Anesthesia Guidelines / Protocol; Standing  -     Chlorhexidine Skin Prep; Future  -     Obtain informed consent; Standing  -     Verify / Perform Chlorhexidine Skin Prep; Standing  -     Insert Peripheral IV; Standing  -     Saline Lock & Maintain IV Access; Standing  -     Place Sequential Compression Device; Standing  -     Maintain Sequential Compression Device; Standing          * No active  hospital problems. *          PLAN:   quant HCG.  Rh pos    Suction D&C    RISKS, ALTERNATIVES, COMPLICATIONS OF THE PROCEDURE INCLUDING BUT NOT LIMITED TO:      INTRAOPERATIVE RISKS:   INJURY TO INTERNAL AND ADJACENT ORGANS AND STRUCTURES (BOWEL, BLADDER, URETER,BLOOD VESSELS) OR HEMORRHAGE REQUIRING FURTHER SURGERY (LAPAROTOMY),  POSSIBLE NON-DIAGNOSTIC FINDINGS, DISCOVERY OF POSSIBLE MALIGNANCY, INFECTION, AND DEATH; DEEP VENOUS THROMBOSIS, PULMONARY EMBOLISM, PULMONARY COMPLICATIONS SUCH AS PNEUMONIA, CARDIAC EVENTS, HERNIAS, SMALL BOWEL OBSTRUCTION, BOWEL INJURY AND DISFIGURING SCARS.    POSTOPERATIVE COMPLICATIONS:   BLEEDING, INFECTION (REQUIRING POSSIBLE REOPERATION), FAILURE OF GOAL OF SURGERY AND RECURRENCE OF ORIGINAL SYMPTOMS, PNEUMONIA, PULMONARY EMBOLISM, AND DEATH;  WERE EXPLAINED TO THE PT WHO VERBALIZED HER UNDERSTANDING.        Pt instructed to call for results of any testing done today if she does not hear from us, and that failure to do so could result in inadequate treatment . Pt verbalized her understanding.     No follow-ups on file..  Instructions and precautions given.     Time Spent: I spent 30+ minutes caring for Krupa on this date of service. This time includes time spent by me in the following activities: preparing for the visit, reviewing tests, obtaining and/or reviewing a separately obtained history, performing a medically appropriate examination and/or evaluation, counseling and educating the patient/family/caregiver, ordering medications, tests, or procedures, referring and communicating with other health care professionals, documenting information in the medical record, independently interpreting results and communicating that information with the patient/family/caregiver, and care coordination.      Jun Fisher MD  10:41 EDT   10/09/24

## 2024-10-10 ENCOUNTER — HOSPITAL ENCOUNTER (EMERGENCY)
Facility: HOSPITAL | Age: 34
Discharge: HOME | End: 2024-10-10
Attending: STUDENT IN AN ORGANIZED HEALTH CARE EDUCATION/TRAINING PROGRAM
Payer: MEDICAID

## 2024-10-10 ENCOUNTER — ANESTHESIA (OUTPATIENT)
Dept: PERIOP | Facility: HOSPITAL | Age: 34
End: 2024-10-10
Payer: MEDICAID

## 2024-10-10 ENCOUNTER — HOSPITAL ENCOUNTER (OUTPATIENT)
Facility: HOSPITAL | Age: 34
Setting detail: HOSPITAL OUTPATIENT SURGERY
Discharge: HOME | End: 2024-10-10
Attending: OBSTETRICS & GYNECOLOGY | Admitting: OBSTETRICS & GYNECOLOGY
Payer: MEDICAID

## 2024-10-10 VITALS
HEIGHT: 61 IN | DIASTOLIC BLOOD PRESSURE: 73 MMHG | WEIGHT: 109.36 LBS | HEART RATE: 74 BPM | SYSTOLIC BLOOD PRESSURE: 103 MMHG | BODY MASS INDEX: 20.65 KG/M2 | RESPIRATION RATE: 16 BRPM | OXYGEN SATURATION: 100 % | TEMPERATURE: 96.9 F

## 2024-10-10 VITALS
BODY MASS INDEX: 20.58 KG/M2 | RESPIRATION RATE: 16 BRPM | WEIGHT: 109 LBS | OXYGEN SATURATION: 98 % | TEMPERATURE: 98.7 F | HEIGHT: 61 IN | SYSTOLIC BLOOD PRESSURE: 131 MMHG | DIASTOLIC BLOOD PRESSURE: 86 MMHG | HEART RATE: 100 BPM

## 2024-10-10 DIAGNOSIS — O02.1 MISSED ABORTION: ICD-10-CM

## 2024-10-10 DIAGNOSIS — N93.9 VAGINAL BLEEDING: Primary | ICD-10-CM

## 2024-10-10 PROBLEM — Z98.890 S/P DILATATION AND CURETTAGE: Status: ACTIVE | Noted: 2024-10-10

## 2024-10-10 LAB
ALBUMIN SERPL-MCNC: 4.2 G/DL (ref 3.5–5.2)
ALBUMIN/GLOB SERPL: 1.7 G/DL
ALP SERPL-CCNC: 46 U/L (ref 39–117)
ALT SERPL W P-5'-P-CCNC: 13 U/L (ref 1–33)
ANION GAP SERPL CALCULATED.3IONS-SCNC: 11.8 MMOL/L (ref 5–15)
AST SERPL-CCNC: 22 U/L (ref 1–32)
BACTERIA UR QL AUTO: ABNORMAL /HPF
BASOPHILS # BLD AUTO: 0.02 10*3/MM3 (ref 0–0.2)
BASOPHILS # BLD AUTO: 0.04 10*3/MM3 (ref 0–0.2)
BASOPHILS NFR BLD AUTO: 0.2 % (ref 0–1.5)
BASOPHILS NFR BLD AUTO: 0.6 % (ref 0–1.5)
BILIRUB SERPL-MCNC: 0.2 MG/DL (ref 0–1.2)
BILIRUB UR QL STRIP: ABNORMAL
BUN SERPL-MCNC: 10 MG/DL (ref 6–20)
BUN/CREAT SERPL: 18.9 (ref 7–25)
CALCIUM SPEC-SCNC: 8.9 MG/DL (ref 8.6–10.5)
CHLORIDE SERPL-SCNC: 106 MMOL/L (ref 98–107)
CLARITY UR: ABNORMAL
CO2 SERPL-SCNC: 18.2 MMOL/L (ref 22–29)
COLOR UR: ABNORMAL
CREAT SERPL-MCNC: 0.53 MG/DL (ref 0.57–1)
DEPRECATED RDW RBC AUTO: 43.8 FL (ref 37–54)
DEPRECATED RDW RBC AUTO: 44.7 FL (ref 37–54)
EGFRCR SERPLBLD CKD-EPI 2021: 124.6 ML/MIN/1.73
EOSINOPHIL # BLD AUTO: 0.01 10*3/MM3 (ref 0–0.4)
EOSINOPHIL # BLD AUTO: 0.1 10*3/MM3 (ref 0–0.4)
EOSINOPHIL NFR BLD AUTO: 0.1 % (ref 0.3–6.2)
EOSINOPHIL NFR BLD AUTO: 1.6 % (ref 0.3–6.2)
ERYTHROCYTE [DISTWIDTH] IN BLOOD BY AUTOMATED COUNT: 14.2 % (ref 12.3–15.4)
ERYTHROCYTE [DISTWIDTH] IN BLOOD BY AUTOMATED COUNT: 14.4 % (ref 12.3–15.4)
GLOBULIN UR ELPH-MCNC: 2.5 GM/DL
GLUCOSE SERPL-MCNC: 188 MG/DL (ref 65–99)
GLUCOSE UR STRIP-MCNC: ABNORMAL MG/DL
HCT VFR BLD AUTO: 37.2 % (ref 34–46.6)
HCT VFR BLD AUTO: 40.6 % (ref 34–46.6)
HGB BLD-MCNC: 12.7 G/DL (ref 12–15.9)
HGB BLD-MCNC: 13.9 G/DL (ref 12–15.9)
HGB UR QL STRIP.AUTO: ABNORMAL
HYALINE CASTS UR QL AUTO: ABNORMAL /LPF
IMM GRANULOCYTES # BLD AUTO: 0.02 10*3/MM3 (ref 0–0.05)
IMM GRANULOCYTES # BLD AUTO: 0.03 10*3/MM3 (ref 0–0.05)
IMM GRANULOCYTES NFR BLD AUTO: 0.3 % (ref 0–0.5)
IMM GRANULOCYTES NFR BLD AUTO: 0.3 % (ref 0–0.5)
KETONES UR QL STRIP: ABNORMAL
LEUKOCYTE ESTERASE UR QL STRIP.AUTO: NEGATIVE
LYMPHOCYTES # BLD AUTO: 0.71 10*3/MM3 (ref 0.7–3.1)
LYMPHOCYTES # BLD AUTO: 1.61 10*3/MM3 (ref 0.7–3.1)
LYMPHOCYTES NFR BLD AUTO: 25.6 % (ref 19.6–45.3)
LYMPHOCYTES NFR BLD AUTO: 7.4 % (ref 19.6–45.3)
MCH RBC QN AUTO: 28.9 PG (ref 26.6–33)
MCH RBC QN AUTO: 29.1 PG (ref 26.6–33)
MCHC RBC AUTO-ENTMCNC: 34.1 G/DL (ref 31.5–35.7)
MCHC RBC AUTO-ENTMCNC: 34.2 G/DL (ref 31.5–35.7)
MCV RBC AUTO: 84.5 FL (ref 79–97)
MCV RBC AUTO: 85.1 FL (ref 79–97)
MONOCYTES # BLD AUTO: 0.61 10*3/MM3 (ref 0.1–0.9)
MONOCYTES # BLD AUTO: 0.69 10*3/MM3 (ref 0.1–0.9)
MONOCYTES NFR BLD AUTO: 11 % (ref 5–12)
MONOCYTES NFR BLD AUTO: 6.3 % (ref 5–12)
NEUTROPHILS NFR BLD AUTO: 3.84 10*3/MM3 (ref 1.7–7)
NEUTROPHILS NFR BLD AUTO: 60.9 % (ref 42.7–76)
NEUTROPHILS NFR BLD AUTO: 8.25 10*3/MM3 (ref 1.7–7)
NEUTROPHILS NFR BLD AUTO: 85.7 % (ref 42.7–76)
NITRITE UR QL STRIP: POSITIVE
NRBC BLD AUTO-RTO: 0 /100 WBC (ref 0–0.2)
NRBC BLD AUTO-RTO: 0 /100 WBC (ref 0–0.2)
PH UR STRIP.AUTO: <=5 [PH] (ref 4.5–8)
PLATELET # BLD AUTO: 227 10*3/MM3 (ref 140–450)
PLATELET # BLD AUTO: 246 10*3/MM3 (ref 140–450)
PMV BLD AUTO: 10.3 FL (ref 6–12)
PMV BLD AUTO: 9.9 FL (ref 6–12)
POTASSIUM SERPL-SCNC: 3.9 MMOL/L (ref 3.5–5.2)
PROT SERPL-MCNC: 6.7 G/DL (ref 6–8.5)
PROT UR QL STRIP: ABNORMAL
RBC # BLD AUTO: 4.4 10*6/MM3 (ref 3.77–5.28)
RBC # BLD AUTO: 4.77 10*6/MM3 (ref 3.77–5.28)
RBC # UR STRIP: ABNORMAL /HPF
REF LAB TEST METHOD: ABNORMAL
SODIUM SERPL-SCNC: 136 MMOL/L (ref 136–145)
SP GR UR STRIP: 1.04 (ref 1–1.03)
SQUAMOUS #/AREA URNS HPF: ABNORMAL /HPF
UROBILINOGEN UR QL STRIP: ABNORMAL
WBC # UR STRIP: ABNORMAL /HPF
WBC NRBC COR # BLD AUTO: 6.3 10*3/MM3 (ref 3.4–10.8)
WBC NRBC COR # BLD AUTO: 9.63 10*3/MM3 (ref 3.4–10.8)

## 2024-10-10 PROCEDURE — 25010000002 ONDANSETRON PER 1 MG: Performed by: NURSE ANESTHETIST, CERTIFIED REGISTERED

## 2024-10-10 PROCEDURE — 59820 CARE OF MISCARRIAGE: CPT | Performed by: OBSTETRICS & GYNECOLOGY

## 2024-10-10 PROCEDURE — 25810000003 LACTATED RINGERS PER 1000 ML: Performed by: NURSE ANESTHETIST, CERTIFIED REGISTERED

## 2024-10-10 PROCEDURE — 85025 COMPLETE CBC W/AUTO DIFF WBC: CPT | Performed by: OBSTETRICS & GYNECOLOGY

## 2024-10-10 PROCEDURE — 96360 HYDRATION IV INFUSION INIT: CPT

## 2024-10-10 PROCEDURE — 25010000002 METHYLERGONOVINE MALEATE PER 0.2 MG: Performed by: ANESTHESIOLOGY

## 2024-10-10 PROCEDURE — 25010000002 DEXAMETHASONE PER 1 MG: Performed by: NURSE ANESTHETIST, CERTIFIED REGISTERED

## 2024-10-10 PROCEDURE — 81001 URINALYSIS AUTO W/SCOPE: CPT | Performed by: STUDENT IN AN ORGANIZED HEALTH CARE EDUCATION/TRAINING PROGRAM

## 2024-10-10 PROCEDURE — 25010000002 FENTANYL CITRATE (PF) 50 MCG/ML SOLUTION: Performed by: ANESTHESIOLOGY

## 2024-10-10 PROCEDURE — 25010000002 LIDOCAINE 2% SOLUTION: Performed by: ANESTHESIOLOGY

## 2024-10-10 PROCEDURE — 88305 TISSUE EXAM BY PATHOLOGIST: CPT | Performed by: OBSTETRICS & GYNECOLOGY

## 2024-10-10 PROCEDURE — 99283 EMERGENCY DEPT VISIT LOW MDM: CPT | Performed by: STUDENT IN AN ORGANIZED HEALTH CARE EDUCATION/TRAINING PROGRAM

## 2024-10-10 PROCEDURE — 25010000002 PROPOFOL 200 MG/20ML EMULSION: Performed by: ANESTHESIOLOGY

## 2024-10-10 PROCEDURE — 80053 COMPREHEN METABOLIC PANEL: CPT | Performed by: STUDENT IN AN ORGANIZED HEALTH CARE EDUCATION/TRAINING PROGRAM

## 2024-10-10 PROCEDURE — 99282 EMERGENCY DEPT VISIT SF MDM: CPT | Performed by: STUDENT IN AN ORGANIZED HEALTH CARE EDUCATION/TRAINING PROGRAM

## 2024-10-10 PROCEDURE — 85025 COMPLETE CBC W/AUTO DIFF WBC: CPT | Performed by: STUDENT IN AN ORGANIZED HEALTH CARE EDUCATION/TRAINING PROGRAM

## 2024-10-10 PROCEDURE — 25810000003 LACTATED RINGERS SOLUTION: Performed by: STUDENT IN AN ORGANIZED HEALTH CARE EDUCATION/TRAINING PROGRAM

## 2024-10-10 RX ORDER — MISOPROSTOL 200 UG/1
1000 TABLET ORAL ONCE
Status: COMPLETED | OUTPATIENT
Start: 2024-10-10 | End: 2024-10-10

## 2024-10-10 RX ORDER — SODIUM CHLORIDE 0.9 % (FLUSH) 0.9 %
10 SYRINGE (ML) INJECTION EVERY 12 HOURS SCHEDULED
Status: DISCONTINUED | OUTPATIENT
Start: 2024-10-10 | End: 2024-10-10 | Stop reason: HOSPADM

## 2024-10-10 RX ORDER — LIDOCAINE HYDROCHLORIDE 20 MG/ML
INJECTION, SOLUTION INFILTRATION; PERINEURAL AS NEEDED
Status: DISCONTINUED | OUTPATIENT
Start: 2024-10-10 | End: 2024-10-10 | Stop reason: SURG

## 2024-10-10 RX ORDER — ACETAMINOPHEN 500 MG
1000 TABLET ORAL ONCE
Status: COMPLETED | OUTPATIENT
Start: 2024-10-10 | End: 2024-10-10

## 2024-10-10 RX ORDER — MAGNESIUM HYDROXIDE 1200 MG/15ML
LIQUID ORAL AS NEEDED
Status: DISCONTINUED | OUTPATIENT
Start: 2024-10-10 | End: 2024-10-10 | Stop reason: HOSPADM

## 2024-10-10 RX ORDER — MIDAZOLAM HYDROCHLORIDE 2 MG/2ML
1 INJECTION, SOLUTION INTRAMUSCULAR; INTRAVENOUS
Status: DISCONTINUED | OUTPATIENT
Start: 2024-10-10 | End: 2024-10-10 | Stop reason: HOSPADM

## 2024-10-10 RX ORDER — FENTANYL CITRATE 50 UG/ML
INJECTION, SOLUTION INTRAMUSCULAR; INTRAVENOUS AS NEEDED
Status: DISCONTINUED | OUTPATIENT
Start: 2024-10-10 | End: 2024-10-10 | Stop reason: SURG

## 2024-10-10 RX ORDER — FAMOTIDINE 10 MG/ML
20 INJECTION, SOLUTION INTRAVENOUS
Status: COMPLETED | OUTPATIENT
Start: 2024-10-10 | End: 2024-10-10

## 2024-10-10 RX ORDER — SODIUM CHLORIDE, SODIUM LACTATE, POTASSIUM CHLORIDE, CALCIUM CHLORIDE 600; 310; 30; 20 MG/100ML; MG/100ML; MG/100ML; MG/100ML
9 INJECTION, SOLUTION INTRAVENOUS CONTINUOUS
Status: DISCONTINUED | OUTPATIENT
Start: 2024-10-10 | End: 2024-10-10 | Stop reason: HOSPADM

## 2024-10-10 RX ORDER — LIDOCAINE HYDROCHLORIDE 10 MG/ML
0.5 INJECTION, SOLUTION EPIDURAL; INFILTRATION; INTRACAUDAL; PERINEURAL ONCE AS NEEDED
Status: DISCONTINUED | OUTPATIENT
Start: 2024-10-10 | End: 2024-10-10 | Stop reason: HOSPADM

## 2024-10-10 RX ORDER — METHYLERGONOVINE MALEATE 0.2 MG/1
200 TABLET ORAL ONCE
Status: COMPLETED | OUTPATIENT
Start: 2024-10-10 | End: 2024-10-10

## 2024-10-10 RX ORDER — OXYCODONE AND ACETAMINOPHEN 5; 325 MG/1; MG/1
1 TABLET ORAL ONCE AS NEEDED
Status: DISCONTINUED | OUTPATIENT
Start: 2024-10-10 | End: 2024-10-10 | Stop reason: HOSPADM

## 2024-10-10 RX ORDER — SODIUM CHLORIDE, SODIUM LACTATE, POTASSIUM CHLORIDE, CALCIUM CHLORIDE 600; 310; 30; 20 MG/100ML; MG/100ML; MG/100ML; MG/100ML
100 INJECTION, SOLUTION INTRAVENOUS ONCE
Status: DISCONTINUED | OUTPATIENT
Start: 2024-10-10 | End: 2024-10-10 | Stop reason: HOSPADM

## 2024-10-10 RX ORDER — DEXAMETHASONE SODIUM PHOSPHATE 10 MG/ML
8 INJECTION INTRAMUSCULAR; INTRAVENOUS ONCE AS NEEDED
Status: COMPLETED | OUTPATIENT
Start: 2024-10-10 | End: 2024-10-10

## 2024-10-10 RX ORDER — METHYLERGONOVINE MALEATE 0.2 MG/ML
INJECTION INTRAVENOUS AS NEEDED
Status: DISCONTINUED | OUTPATIENT
Start: 2024-10-10 | End: 2024-10-10 | Stop reason: SURG

## 2024-10-10 RX ORDER — SODIUM CHLORIDE 0.9 % (FLUSH) 0.9 %
10 SYRINGE (ML) INJECTION AS NEEDED
Status: DISCONTINUED | OUTPATIENT
Start: 2024-10-10 | End: 2024-10-10 | Stop reason: HOSPADM

## 2024-10-10 RX ORDER — METHYLERGONOVINE MALEATE 0.2 MG/1
0.2 TABLET ORAL EVERY 6 HOURS
Qty: 2 TABLET | Refills: 0 | Status: SHIPPED | OUTPATIENT
Start: 2024-10-10 | End: 2024-10-11

## 2024-10-10 RX ORDER — PROPOFOL 10 MG/ML
INJECTION, EMULSION INTRAVENOUS CONTINUOUS PRN
Status: DISCONTINUED | OUTPATIENT
Start: 2024-10-10 | End: 2024-10-10 | Stop reason: SURG

## 2024-10-10 RX ORDER — ONDANSETRON 2 MG/ML
4 INJECTION INTRAMUSCULAR; INTRAVENOUS ONCE AS NEEDED
Status: DISCONTINUED | OUTPATIENT
Start: 2024-10-10 | End: 2024-10-10 | Stop reason: HOSPADM

## 2024-10-10 RX ORDER — KETAMINE HYDROCHLORIDE 10 MG/ML
INJECTION, SOLUTION INTRAMUSCULAR; INTRAVENOUS AS NEEDED
Status: DISCONTINUED | OUTPATIENT
Start: 2024-10-10 | End: 2024-10-10 | Stop reason: SURG

## 2024-10-10 RX ORDER — IBUPROFEN 800 MG/1
800 TABLET, FILM COATED ORAL EVERY 8 HOURS PRN
Qty: 30 TABLET | Refills: 0 | Status: SHIPPED | OUTPATIENT
Start: 2024-10-10

## 2024-10-10 RX ORDER — ONDANSETRON 2 MG/ML
4 INJECTION INTRAMUSCULAR; INTRAVENOUS ONCE AS NEEDED
Status: COMPLETED | OUTPATIENT
Start: 2024-10-10 | End: 2024-10-10

## 2024-10-10 RX ADMIN — PROPOFOL 20 MG: 10 INJECTION, EMULSION INTRAVENOUS at 11:09

## 2024-10-10 RX ADMIN — PROPOFOL 125 MCG/KG/MIN: 10 INJECTION, EMULSION INTRAVENOUS at 11:05

## 2024-10-10 RX ADMIN — KETAMINE HYDROCHLORIDE 10 MG: 10 INJECTION INTRAMUSCULAR; INTRAVENOUS at 11:16

## 2024-10-10 RX ADMIN — MISOPROSTOL 1000 MCG: 200 TABLET ORAL at 20:48

## 2024-10-10 RX ADMIN — FENTANYL CITRATE 25 MCG: 50 INJECTION, SOLUTION INTRAMUSCULAR; INTRAVENOUS at 11:13

## 2024-10-10 RX ADMIN — ACETAMINOPHEN 1000 MG: 500 TABLET ORAL at 10:35

## 2024-10-10 RX ADMIN — DEXAMETHASONE SODIUM PHOSPHATE 8 MG: 10 INJECTION INTRAMUSCULAR; INTRAVENOUS at 10:35

## 2024-10-10 RX ADMIN — LIDOCAINE HYDROCHLORIDE 60 MG: 20 INJECTION, SOLUTION INFILTRATION; PERINEURAL at 11:05

## 2024-10-10 RX ADMIN — SODIUM CHLORIDE, POTASSIUM CHLORIDE, SODIUM LACTATE AND CALCIUM CHLORIDE 1000 ML: 600; 310; 30; 20 INJECTION, SOLUTION INTRAVENOUS at 19:58

## 2024-10-10 RX ADMIN — FENTANYL CITRATE 25 MCG: 50 INJECTION, SOLUTION INTRAMUSCULAR; INTRAVENOUS at 11:05

## 2024-10-10 RX ADMIN — METHYLERGONOVINE MALEATE 200 MCG: 0.2 INJECTION, SOLUTION INTRAMUSCULAR; INTRAVENOUS at 11:23

## 2024-10-10 RX ADMIN — FAMOTIDINE 20 MG: 10 INJECTION, SOLUTION INTRAVENOUS at 10:35

## 2024-10-10 RX ADMIN — METHYLERGONOVINE MALEATE 200 MCG: 0.2 TABLET ORAL at 21:41

## 2024-10-10 RX ADMIN — SODIUM CHLORIDE, POTASSIUM CHLORIDE, SODIUM LACTATE AND CALCIUM CHLORIDE 9 ML/HR: 600; 310; 30; 20 INJECTION, SOLUTION INTRAVENOUS at 10:35

## 2024-10-10 RX ADMIN — PROPOFOL 50 MG: 10 INJECTION, EMULSION INTRAVENOUS at 11:06

## 2024-10-10 RX ADMIN — ONDANSETRON 4 MG: 2 INJECTION INTRAMUSCULAR; INTRAVENOUS at 10:34

## 2024-10-10 NOTE — ANESTHESIA PREPROCEDURE EVALUATION
Anesthesia Evaluation     Patient summary reviewed and Nursing notes reviewed   no history of anesthetic complications:   NPO Solid Status: > 8 hours  NPO Liquid Status: > 2 hours           Airway   Mallampati: II  TM distance: >3 FB  Neck ROM: full  No difficulty expected  Dental      Pulmonary     breath sounds clear to auscultation  Cardiovascular - negative cardio ROS  Exercise tolerance: good (4-7 METS)    Rhythm: regular  Rate: normal        Neuro/Psych  (+) headaches  GI/Hepatic/Renal/Endo - negative ROS     Musculoskeletal (-) negative ROS    Abdominal    Substance History - negative use     OB/GYN    (+) Pregnant    Comment: Missed  w/ fetal pole, 13 weeks  Pregnancy with inconclusive fetal viability  Subchorionic hemorrhage in first trimester          Other - negative ROS                   Anesthesia Plan    ASA 2     MAC     intravenous induction     Anesthetic plan, risks, benefits, and alternatives have been provided, discussed and informed consent has been obtained with: patient.    Use of blood products discussed with patient  Consented to blood products.      CODE STATUS:    Level Of Support Discussed With: Patient  Code Status (Patient has no pulse and is not breathing): CPR (Attempt to Resuscitate)  Medical Interventions (Patient has pulse or is breathing): Full Support

## 2024-10-10 NOTE — ED PROVIDER NOTES
Subjective   History of Present Illness  Pt is a 34 y.o. female with PMH as listed who presents for   Chief Complaint   Patient presents with    Vaginal Bleeding     Pt reports DNC today. More bleeding than expected per pt       Patient is a 34-year-old female presents for vaginal bleeding for the past hour and a half.  She had a D&C after spontaneous miscarriage at 13 weeks unfortunately and was told after the procedure that if she had significant bleeding or abdominal pain to present to the ED for evaluation.  She called the on-call OB number who recommended she present to the ED.  She denies any significant abdominal pain, some mild cramping but nothing outside of would be expected for patient's procedure.  Patient is any chest pain shortness of breath nausea or vomiting.  No systemic symptoms and vital signs stable.      Review of Systems    Past Medical History:   Diagnosis Date    Ectopic pregnancy     Migraine     PMS (premenstrual syndrome)     Urinary tract infection        Allergies   Allergen Reactions    Sulfamethoxazole-Trimethoprim Hives       Past Surgical History:   Procedure Laterality Date    BREAST AUGMENTATION      WISDOM TOOTH EXTRACTION         No family history on file.    Social History     Socioeconomic History    Marital status:    Tobacco Use    Smoking status: Never    Smokeless tobacco: Never   Vaping Use    Vaping status: Never Used   Substance and Sexual Activity    Alcohol use: Never    Drug use: Never    Sexual activity: Yes     Partners: Male     Birth control/protection: Condom           Objective   Physical Exam  Constitutional:       Appearance: Normal appearance.   HENT:      Head: Normocephalic and atraumatic.      Mouth/Throat:      Mouth: Mucous membranes are moist.      Pharynx: Oropharynx is clear.   Eyes:      Conjunctiva/sclera: Conjunctivae normal.   Cardiovascular:      Rate and Rhythm: Normal rate and regular rhythm.   Pulmonary:      Effort: Pulmonary effort is  normal.      Breath sounds: Normal breath sounds.   Abdominal:      General: Abdomen is flat.      Palpations: Abdomen is soft.      Tenderness: There is no abdominal tenderness.   Musculoskeletal:      Cervical back: Neck supple.   Skin:     General: Skin is warm and dry.   Neurological:      Mental Status: She is alert.   Psychiatric:         Mood and Affect: Mood normal.         Procedures           ED Course  ED Course as of 10/10/24 2120   Thu Oct 10, 2024   1937 Patient is a 34-year-old female presents for vaginal bleeding for the past hour and a half.  She had a D&C after spontaneous miscarriage at 13 weeks unfortunately and was told after the procedure that if she had significant bleeding or abdominal pain to present to the ED for evaluation.  She called the on-call OB number who recommended she present to the ED.  She denies any significant abdominal pain, some mild cramping but nothing outside of would be expected for patient's procedure.  Patient is any chest pain shortness of breath nausea or vomiting.  No systemic symptoms and vital signs stable.  Spoke with ROHINI Mark for OB/GYN service who will speak with on-call physician for further recommendations.  Suspect patient may need to go to the OR again.  Will obtain CBC CMP and UA in the meantime while waiting for callback. [JF]   1951 Spoke with Dr. Ely with OB/GYN, he is on his way in for a delivery and will come by the ED to evaluate patient and likely perform speculum exam where he will provide further recommendations.  Requesting patient receive LR bolus in the meantime.  Pending lab work and UA. [JF]   2119 Dr. Martínez evaluated patient, who is recommending patient to be discharged home as her bleeding has ceased while here in the ED after the Cytotec.  He is recommending the patient received 4 doses of Methergine.  Given initial dose here and will take 3 doses after she is discharged.  Sent home with 1 pill to take overnight as pharmacy is closed  and then 2 pills sent to patient's pharmacy as a prescription.  Discussed all of this with patient, she understands and agrees with plan of care.  All questions answered. [JF]      ED Course User Index  [JF] Reed Rose MD                                             Medical Decision Making  My differential diagnosis for vaginal bleeding includes but is not limited to normal menstruation, menorrhagia, menorrhagia, metromenorrhagia, threatened miscarriage, incomplete miscarriage, imminent miscarriage, dysfunctional uterine bleeding, bleeding disorders, vaginal trauma, STDs and PID.      Problems Addressed:  Vaginal bleeding: complicated acute illness or injury    Amount and/or Complexity of Data Reviewed  Labs: ordered. Decision-making details documented in ED Course.  Discussion of management or test interpretation with external provider(s): Spoke with ROHINI Mark for OB/GYN service, she will speak with Dr. Ely the OB/GYN its on-call and he will call back with further recommendations.    Spoke with Dr. Ely with OB/GYN, he is on his way in for a delivery and will come by the ED to evaluate patient and likely perform speculum exam where he will provide further recommendations.  Requesting patient receive LR bolus in the meantime.  Pending lab work and UA.    Dr. Erickson evaluated patient, his final recommendations in ED workup.  Recommending discharge.    Risk  Prescription drug management.        Final diagnoses:   Vaginal bleeding       ED Disposition  ED Disposition       ED Disposition   Discharge    Condition   Stable    Comment   --               Jun Fisher MD  [address]  [address]  [place] [address]  [phone]    Schedule an appointment as soon as possible for a visit in 2 weeks  as scheduled         Medication List        New Prescriptions      methylergonovine 0.2 MG tablet  Commonly known as: Methergine  Take 1 tablet by mouth Every 6 (Six) Hours for 2 doses.                Where to Get Your Medications        These medications were sent to [facility] [id number] - [facility] [address] AT [facility] [address] [facility] [address] - [phone]  - [phone]   [address], [facility] [address]      Phone: [phone]   methylergonovine 0.2 MG tablet            Reed Rose MD  10/10/24 9759

## 2024-10-10 NOTE — INTERVAL H&P NOTE
"H&P reviewed. The patient was examined and there are no changes to the H&P.    /84 (BP Location: Right arm, Patient Position: Sitting)   Pulse 86   Temp 97.6 °F (36.4 °C) (Oral)   Resp 18   Ht 154.9 cm (61\")   Wt 49.6 kg (109 lb 5.8 oz)   LMP 07/10/2024 (Approximate)   SpO2 99%   BMI 20.66 kg/m²     Medications Prior to Admission   Medication Sig Dispense Refill Last Dose    ondansetron ODT (ZOFRAN-ODT) 4 MG disintegrating tablet Place 1 tablet on the tongue Every 8 (Eight) Hours As Needed for Nausea. 30 tablet 2 Past Month    promethazine (PHENERGAN) 12.5 MG tablet Take 1 tablet by mouth Every 6 (Six) Hours As Needed for Nausea or Vomiting. 30 tablet 1 Past Month    famotidine (Pepcid) 20 MG tablet Take 1 tablet by mouth 2 (Two) Times a Day. 60 tablet 1 Unknown    Prenatal Vit-Fe Fumarate-FA (PRENATAL PO) Take  by mouth.   10/6/2024     "

## 2024-10-10 NOTE — OP NOTE
OPERATIVE NOTE 10/10/24    SUCTION DILATATION & CURETTAGE    PREOP DIAGNOSIS:  missed ab @ 13 weeks, rh pos    POSTOP DIAGNOSIS:  same    SURGEON:   Phillip    ASSIST:  none    ANESTHESIA:  MAC    EBL:   30cc    IVFS:  500cc    UO:  25cc    COMPLICATIONS:  none    FINDINGS:  moderate amt POC    ANTIBIOTICS:  none      Description of the procedure:    Pt was brought to OR suite after informed consent was obtained.  Placed on table in dorsolithotomy position after laryngeal mask anesthesia was induced without difficulty.     She was prepped and draped, time out done, no antibiotics were given, and bladder emptied with straight cath.    An exam under anesthesia indicated a uterus that was 9wks in size, anteverted and mobile.    An open graves speculum was placed in the vagina and the anterior cervical lip was grasped with a single toothed tenaculum, and the uterus was sounded to about 7 cms.      [place] dilators were then used to serially dilate the endocervical canal sufficiently to admit a 9mm suction cannula which was used to evacuate the endometrial contents.  A serrated curette was used to curette out the entire endometrial cavity till a sand-paper sensation was felt indicating to me complete evacuation of the products of conception. Pt was given 0.2mg of methergine to facilitate uterine involution.    An exam after the procedure indicated a uterus that was smaller.     The specimen was send for permanent section.    Pt tolerated procedure well and went to the recovery room in satisfactory condition.  All sponge, instrument counts were correct x 3 according the OR personnel.   There was no evidence of uterine perforation or cervical laceration.        Pt is Rh pos.     Jun Fisher MD  11:42 EDT  10/10/24

## 2024-10-10 NOTE — ANESTHESIA POSTPROCEDURE EVALUATION
Patient: Krupa Jacob    Procedure Summary       Date: 10/10/24 Room / Location:  [facility] OR 2 /  [facility]    Anesthesia Start: 1059 Anesthesia Stop:     Procedure: DILATATION AND CURETTAGE WITH SUCTION (Vagina) Diagnosis:       Missed       (Missed  [O02.1])    Surgeons: Jun Fisher MD Provider: Josselin Shane MD    Anesthesia Type: MAC ASA Status: 2            Anesthesia Type: MAC    Vitals  Vitals Value Taken Time   BP 98/70 10/10/24 1200   Temp 96.9 °F (36.1 °C) 10/10/24 1138   Pulse 68 10/10/24 1207   Resp 20 10/10/24 1150   SpO2 100 % 10/10/24 1207   Vitals shown include unfiled device data.        Post Anesthesia Care and Evaluation    Patient location during evaluation: PHASE II  Patient participation: complete - patient participated  Level of consciousness: awake  Pain score: 1  Pain management: adequate    Airway patency: patent  Anesthetic complications: No anesthetic complications  PONV Status: none  Cardiovascular status: acceptable  Respiratory status: acceptable  Hydration status: acceptable

## 2024-10-11 ENCOUNTER — TELEPHONE (OUTPATIENT)
Dept: OBSTETRICS AND GYNECOLOGY | Facility: CLINIC | Age: 34
End: 2024-10-11

## 2024-10-11 LAB
CYTO UR: NORMAL
LAB AP CASE REPORT: NORMAL
PATH REPORT.FINAL DX SPEC: NORMAL
PATH REPORT.GROSS SPEC: NORMAL

## 2024-10-11 NOTE — CONSULTS
Referring Provider: Dr. Reed Rose  Reason for Consultation: Abnormal bleeding following suction dilation curettage    Patient Care Team:  Provider, No Known as PCP - Deedee White APRN as Nurse Practitioner (Obstetrics and Gynecology)    Chief complaint vaginal bleeding    Subjective .     History of present illness: Patient is a  who underwent an uncomplicated suction dilation and curettage this morning for 13-week missed .  She was discharged in routine fashion but says that heavy bleeding worsened when she got home.  She denies any passage of large blood clots or products of conception/tissue however her bleeding worsened to the point where she has saturated 4 jumbo pads in less than 2 hours.  She denies any hypotensive symptoms including lightheadedness, dizziness, syncope.  She also denies any fevers, chills, nausea, vomiting, or abdominal cramps.    Objective     Vital Signs   Temp:  [96.9 °F (36.1 °C)-98.7 °F (37.1 °C)] 98.7 °F (37.1 °C)  Heart Rate:  [] 100  Resp:  [16-22] 16  BP: ()/(67-96) 131/86    Physical Exam:   Physical Exam  Constitutional:       General: She is not in acute distress.     Appearance: Normal appearance. She is not ill-appearing or toxic-appearing.   Eyes:      Pupils: Pupils are equal, round, and reactive to light.   Cardiovascular:      Rate and Rhythm: Normal rate.   Pulmonary:      Effort: Pulmonary effort is normal. No respiratory distress.   Abdominal:      General: Abdomen is flat. There is no distension.      Tenderness: There is no abdominal tenderness. There is no guarding or rebound.   Genitourinary:     Comments: Scant bright red bleeding was noted to come from cervical os on speculum exam.  Tenaculum sites were not bleeding and there was no evidence of cervical laceration.  No products of conception in cervical canal. Uterus firm and small on bimanual exam with negligible bleeding during bimanual massage.  Neurological:       General: No focal deficit present.      Mental Status: She is alert.   Psychiatric:         Mood and Affect: Mood normal.         Thought Content: Thought content normal.           Results Review:         WBC   Date Value Ref Range Status   10/10/2024 9.63 3.40 - 10.80 10*3/mm3 Final     RBC   Date Value Ref Range Status   10/10/2024 4.40 3.77 - 5.28 10*6/mm3 Final     Hemoglobin   Date Value Ref Range Status   10/10/2024 12.7 12.0 - 15.9 g/dL Final     Hematocrit   Date Value Ref Range Status   10/10/2024 37.2 34.0 - 46.6 % Final     MCV   Date Value Ref Range Status   10/10/2024 84.5 79.0 - 97.0 fL Final     MCH   Date Value Ref Range Status   10/10/2024 28.9 26.6 - 33.0 pg Final     MCHC   Date Value Ref Range Status   10/10/2024 34.1 31.5 - 35.7 g/dL Final     RDW   Date Value Ref Range Status   10/10/2024 14.2 12.3 - 15.4 % Final     RDW-SD   Date Value Ref Range Status   10/10/2024 43.8 37.0 - 54.0 fl Final     MPV   Date Value Ref Range Status   10/10/2024 10.3 6.0 - 12.0 fL Final     Platelets   Date Value Ref Range Status   10/10/2024 246 140 - 450 10*3/mm3 Final     Neutrophil %   Date Value Ref Range Status   10/10/2024 85.7 (H) 42.7 - 76.0 % Final     Lymphocyte %   Date Value Ref Range Status   10/10/2024 7.4 (L) 19.6 - 45.3 % Final     Monocyte %   Date Value Ref Range Status   10/10/2024 6.3 5.0 - 12.0 % Final     Eosinophil %   Date Value Ref Range Status   10/10/2024 0.1 (L) 0.3 - 6.2 % Final     Basophil %   Date Value Ref Range Status   10/10/2024 0.2 0.0 - 1.5 % Final     Immature Grans %   Date Value Ref Range Status   10/10/2024 0.3 0.0 - 0.5 % Final     Neutrophils, Absolute   Date Value Ref Range Status   10/10/2024 8.25 (H) 1.70 - 7.00 10*3/mm3 Final     Lymphocytes, Absolute   Date Value Ref Range Status   10/10/2024 0.71 0.70 - 3.10 10*3/mm3 Final     Monocytes, Absolute   Date Value Ref Range Status   10/10/2024 0.61 0.10 - 0.90 10*3/mm3 Final     Eosinophils, Absolute   Date Value Ref  Range Status   10/10/2024 0.01 0.00 - 0.40 10*3/mm3 Final     Basophils, Absolute   Date Value Ref Range Status   10/10/2024 0.02 0.00 - 0.20 10*3/mm3 Final     Immature Grans, Absolute   Date Value Ref Range Status   10/10/2024 0.03 0.00 - 0.05 10*3/mm3 Final     nRBC   Date Value Ref Range Status   10/10/2024 0.0 0.0 - 0.2 /100 WBC Final           Assessment & Plan     - Patient had received 1000 mcg of PO Cytotec ~20 mins prior to examination, but vaginal bleeding considerably less than what was shown from pictures that she had experienced earlier today.  Patient truly had saturated through multiple pads in a short amount of time which was persisting until medical management.    - 200 mcg of oral Methergine was also given in the emergency department with a dose to go home with as well as a prescription for pickup tomorrow morning and plans for every 6 hour dosing for 24 hours total.  - Her vital signs had always been stable, she looked well-appearing, and her H/H was normal but considering how severe the bleeding was at time of presentation, I offered her overnight observation for this situation and she declined.    - I instructed her to call back with any resumption of considerable bleeding similar to what she experienced prior, or any abnormal vaginal bleeding with new lightheadedness or dizziness.  - Plan for office follow-up in 2 weeks or as needed    Logan Ely MD  10/10/24  21:43 EDT

## 2024-10-11 NOTE — TELEPHONE ENCOUNTER
Caller: GINNY ZUNIGA    Relationship to patient: Emergency Contact    Best call back number: [phone] (home)     Patient is needing: PT WAS IN THE HOSPITAL FOR DNC ON 10/10/2024. WAS PRESCRIBED methylergonovine TO TAKE FOR 4 DOSES. JOSUÉ IS UNABLE TO FILL MEDICATION UNTIL MONDAY WHICH WOULD BE AFTER MEDICATION SHOULD BE FINISHED WITH. PT SAW DR. ACOSTA.

## 2024-10-23 ENCOUNTER — OFFICE VISIT (OUTPATIENT)
Dept: OBSTETRICS AND GYNECOLOGY | Facility: CLINIC | Age: 34
End: 2024-10-23
Payer: MEDICAID

## 2024-10-23 VITALS
HEIGHT: 61 IN | BODY MASS INDEX: 21.34 KG/M2 | WEIGHT: 113 LBS | DIASTOLIC BLOOD PRESSURE: 60 MMHG | SYSTOLIC BLOOD PRESSURE: 102 MMHG

## 2024-10-23 DIAGNOSIS — Z98.890 S/P DILATATION AND CURETTAGE: Primary | ICD-10-CM

## 2024-10-23 DIAGNOSIS — Z67.90 BLOOD TYPE, RH POSITIVE: ICD-10-CM

## 2024-10-23 DIAGNOSIS — O02.1 MISSED ABORTION: ICD-10-CM

## 2024-10-23 PROCEDURE — 1160F RVW MEDS BY RX/DR IN RCRD: CPT | Performed by: OBSTETRICS & GYNECOLOGY

## 2024-10-23 PROCEDURE — 99024 POSTOP FOLLOW-UP VISIT: CPT | Performed by: OBSTETRICS & GYNECOLOGY

## 2024-10-23 PROCEDURE — 1159F MED LIST DOCD IN RCRD: CPT | Performed by: OBSTETRICS & GYNECOLOGY

## 2024-10-23 RX ORDER — METHYLERGONOVINE MALEATE 0.2 MG/1
TABLET ORAL
COMMUNITY
Start: 2024-10-14

## 2024-10-23 NOTE — PROGRESS NOTES
POSTOP VISIT    Patient Care Team:  Provider, No Known as PCP - Deedee White APRN as Nurse Practitioner (Obstetrics and Gynecology)  -----------------------------------------------------HISTORY---------------------------------------------------    Chief Complaint: Pt here for postop check      34 y.o.  Patient's last menstrual period was 07/10/2024 (approximate).    HPI:  Pt here for postop check for procedure: Dilatation And Curettage With Suction completed on date: 10/10/2024  Pt reports complaints: heavy bleeding with cramps, better today.  Tolerating diet well, normal bladder and bowel function, incision/s healing well.  Vaginal bleeding? yes      PAST HISTORY REVIEWED:  1.   Past Surgical History:   Procedure Laterality Date    BREAST AUGMENTATION      D & C WITH SUCTION N/A 10/10/2024    Procedure: DILATATION AND CURETTAGE WITH SUCTION;  Surgeon: Jun Fisher MD;  Location: [facility];  Service: Obstetrics/Gynecology;  Laterality: N/A;    WISDOM TOOTH EXTRACTION        2.   Current Outpatient Medications:     famotidine (Pepcid) 20 MG tablet, Take 1 tablet by mouth 2 (Two) Times a Day. (Patient not taking: Reported on 10/23/2024), Disp: 60 tablet, Rfl: 1    ibuprofen (ADVIL,MOTRIN) 800 MG tablet, Take 1 tablet by mouth Every 8 (Eight) Hours As Needed for Mild Pain or Moderate Pain. (Patient not taking: Reported on 10/23/2024), Disp: 30 tablet, Rfl: 0    methylergonovine (METHERGINE) 0.2 MG tablet, , Disp: , Rfl:     ondansetron ODT (ZOFRAN-ODT) 4 MG disintegrating tablet, Place 1 tablet on the tongue Every 8 (Eight) Hours As Needed for Nausea. (Patient not taking: Reported on 10/23/2024), Disp: 30 tablet, Rfl: 2    Prenatal Vit-Fe Fumarate-FA (PRENATAL PO), Take  by mouth. (Patient not taking: Reported on 10/23/2024), Disp: , Rfl:     promethazine (PHENERGAN) 12.5 MG tablet, Take 1 tablet by mouth Every 6 (Six) Hours As Needed for Nausea or Vomiting. (Patient not taking:  "Reported on 10/23/2024), Disp: 30 tablet, Rfl: 1  3.   Allergies   Allergen Reactions    Sulfamethoxazole-Trimethoprim Hives       ROS:  Review of Systems:    -----------------------------------------------PHYSICAL EXAM----------------------------------------------    Vital Signs: /60   Ht 154.9 cm (61\")   Wt 51.3 kg (113 lb)   LMP 07/10/2024 (Approximate)   Breastfeeding No   BMI 21.35 kg/m²      Physical Exam  Vitals and nursing note reviewed.   Constitutional:       Appearance: She is well-developed.   HENT:      Head: Normocephalic and atraumatic.   Cardiovascular:      Rate and Rhythm: Normal rate.   Pulmonary:      Effort: Pulmonary effort is normal.   Abdominal:      General: There is no distension.      Palpations: Abdomen is soft. There is no mass.      Tenderness: There is no abdominal tenderness. There is no guarding.   Genitourinary:     Vagina: No vaginal discharge.   Musculoskeletal:         General: No tenderness or deformity. Normal range of motion.      Cervical back: Normal range of motion.   Skin:     General: Skin is warm and dry.      Coloration: Skin is not pale.      Findings: No erythema or rash.   Neurological:      Mental Status: She is alert and oriented to person, place, and time.   Psychiatric:         Behavior: Behavior normal.         Thought Content: Thought content normal.         Judgment: Judgment normal.         -----------------------------------------------MEDICAL DECISION MAKING-----------------------------      DATA Review & labs ordered:        Path report reviewed? yes    IMPRESSION & DIAGNOSIS:      S/p suction D&C for missed ab      PLAN:     Expectant management/     RTO Return if symptoms worsen or fail to improve.         Jun Fisher MD  21:44 EDT  10/29/24      "

## (undated) DEVICE — HOSE BT TO BT VAC CURETTAGE SNGL PT USE EA/10

## (undated) DEVICE — Device

## (undated) DEVICE — SYS FLUID MGMT HYST SAFETOUCH

## (undated) DEVICE — GLV SURG SENSICARE W/ALOE PF LF 7.5 STRL

## (undated) DEVICE — GOWN,PREVENTION PLUS,XLNG/XXLARGE,STRL: Brand: MEDLINE

## (undated) DEVICE — LAG PERI GYN: Brand: MEDLINE INDUSTRIES, INC.

## (undated) DEVICE — TBG W FLTR FOR BERKELEY SYSTEM